# Patient Record
Sex: FEMALE | Race: WHITE | HISPANIC OR LATINO | URBAN - METROPOLITAN AREA
[De-identification: names, ages, dates, MRNs, and addresses within clinical notes are randomized per-mention and may not be internally consistent; named-entity substitution may affect disease eponyms.]

---

## 2017-04-23 ENCOUNTER — EMERGENCY (EMERGENCY)
Facility: HOSPITAL | Age: 57
LOS: 1 days | Discharge: HOME | End: 2017-04-23
Attending: EMERGENCY MEDICINE | Admitting: EMERGENCY MEDICINE
Payer: COMMERCIAL

## 2017-04-23 VITALS
TEMPERATURE: 99 F | OXYGEN SATURATION: 98 % | RESPIRATION RATE: 18 BRPM | DIASTOLIC BLOOD PRESSURE: 79 MMHG | SYSTOLIC BLOOD PRESSURE: 137 MMHG | HEART RATE: 74 BPM

## 2017-04-23 VITALS
OXYGEN SATURATION: 97 % | RESPIRATION RATE: 24 BRPM | DIASTOLIC BLOOD PRESSURE: 85 MMHG | HEART RATE: 94 BPM | TEMPERATURE: 102 F | SYSTOLIC BLOOD PRESSURE: 165 MMHG | WEIGHT: 164.91 LBS

## 2017-04-23 DIAGNOSIS — R10.9 UNSPECIFIED ABDOMINAL PAIN: ICD-10-CM

## 2017-04-23 DIAGNOSIS — N12 TUBULO-INTERSTITIAL NEPHRITIS, NOT SPECIFIED AS ACUTE OR CHRONIC: ICD-10-CM

## 2017-04-23 LAB
ALBUMIN SERPL ELPH-MCNC: 3.9 G/DL — SIGNIFICANT CHANGE UP (ref 3.4–5)
ALP SERPL-CCNC: 84 U/L — SIGNIFICANT CHANGE UP (ref 40–120)
ALT FLD-CCNC: 25 U/L — SIGNIFICANT CHANGE UP (ref 12–42)
ANION GAP SERPL CALC-SCNC: 10 MMOL/L — SIGNIFICANT CHANGE UP (ref 9–16)
APPEARANCE UR: CLEAR — SIGNIFICANT CHANGE UP
AST SERPL-CCNC: 27 U/L — SIGNIFICANT CHANGE UP (ref 15–37)
BACTERIA # UR AUTO: PRESENT /HPF
BASOPHILS NFR BLD AUTO: 0.1 % — SIGNIFICANT CHANGE UP (ref 0–2)
BILIRUB SERPL-MCNC: 0.8 MG/DL — SIGNIFICANT CHANGE UP (ref 0.2–1.2)
BILIRUB UR-MCNC: NEGATIVE — SIGNIFICANT CHANGE UP
BUN SERPL-MCNC: 11 MG/DL — SIGNIFICANT CHANGE UP (ref 7–23)
CALCIUM SERPL-MCNC: 9.1 MG/DL — SIGNIFICANT CHANGE UP (ref 8.5–10.5)
CHLORIDE SERPL-SCNC: 97 MMOL/L — SIGNIFICANT CHANGE UP (ref 96–108)
CO2 SERPL-SCNC: 28 MMOL/L — SIGNIFICANT CHANGE UP (ref 22–31)
COLOR SPEC: YELLOW — SIGNIFICANT CHANGE UP
CREAT SERPL-MCNC: 0.79 MG/DL — SIGNIFICANT CHANGE UP (ref 0.5–1.3)
DIFF PNL FLD: (no result)
EOSINOPHIL NFR BLD AUTO: 0.1 % — SIGNIFICANT CHANGE UP (ref 0–6)
EPI CELLS # UR: SIGNIFICANT CHANGE UP /HPF
GLUCOSE SERPL-MCNC: 135 MG/DL — HIGH (ref 70–99)
GLUCOSE UR QL: NEGATIVE — SIGNIFICANT CHANGE UP
HCT VFR BLD CALC: 35.2 % — SIGNIFICANT CHANGE UP (ref 34.5–45)
HGB BLD-MCNC: 12.4 G/DL — SIGNIFICANT CHANGE UP (ref 11.5–15.5)
KETONES UR-MCNC: NEGATIVE — SIGNIFICANT CHANGE UP
LACTATE SERPL-SCNC: 1.3 MMOL/L — SIGNIFICANT CHANGE UP (ref 0.5–2)
LEUKOCYTE ESTERASE UR-ACNC: (no result)
LYMPHOCYTES # BLD AUTO: 16.5 % — SIGNIFICANT CHANGE UP (ref 13–44)
MCHC RBC-ENTMCNC: 31.6 PG — SIGNIFICANT CHANGE UP (ref 27–34)
MCHC RBC-ENTMCNC: 35.2 G/DL — SIGNIFICANT CHANGE UP (ref 32–36)
MCV RBC AUTO: 89.8 FL — SIGNIFICANT CHANGE UP (ref 80–100)
MONOCYTES NFR BLD AUTO: 7.9 % — SIGNIFICANT CHANGE UP (ref 2–14)
NEUTROPHILS NFR BLD AUTO: 75.4 % — SIGNIFICANT CHANGE UP (ref 43–77)
NITRITE UR-MCNC: NEGATIVE — SIGNIFICANT CHANGE UP
PH UR: 7 — SIGNIFICANT CHANGE UP (ref 5–8)
PLATELET # BLD AUTO: 241 K/UL — SIGNIFICANT CHANGE UP (ref 150–400)
POTASSIUM SERPL-MCNC: 3.6 MMOL/L — SIGNIFICANT CHANGE UP (ref 3.5–5.3)
POTASSIUM SERPL-SCNC: 3.6 MMOL/L — SIGNIFICANT CHANGE UP (ref 3.5–5.3)
PROT SERPL-MCNC: 8.2 G/DL — SIGNIFICANT CHANGE UP (ref 6.4–8.2)
PROT UR-MCNC: NEGATIVE MG/DL — SIGNIFICANT CHANGE UP
RBC # BLD: 3.92 M/UL — SIGNIFICANT CHANGE UP (ref 3.8–5.2)
RBC # FLD: 11.4 % — SIGNIFICANT CHANGE UP (ref 10.3–16.9)
RBC CASTS # UR COMP ASSIST: < 5 /HPF — SIGNIFICANT CHANGE UP
SODIUM SERPL-SCNC: 135 MMOL/L — SIGNIFICANT CHANGE UP (ref 135–145)
SP GR SPEC: <=1.005 — SIGNIFICANT CHANGE UP (ref 1–1.03)
UROBILINOGEN FLD QL: 0.2 E.U./DL — SIGNIFICANT CHANGE UP
WBC # BLD: 10.2 K/UL — SIGNIFICANT CHANGE UP (ref 3.8–10.5)
WBC # FLD AUTO: 10.2 K/UL — SIGNIFICANT CHANGE UP (ref 3.8–10.5)
WBC UR QL: < 5 /HPF — SIGNIFICANT CHANGE UP

## 2017-04-23 PROCEDURE — 93010 ELECTROCARDIOGRAM REPORT: CPT

## 2017-04-23 PROCEDURE — 99284 EMERGENCY DEPT VISIT MOD MDM: CPT | Mod: 25

## 2017-04-23 PROCEDURE — 80053 COMPREHEN METABOLIC PANEL: CPT

## 2017-04-23 PROCEDURE — 96374 THER/PROPH/DIAG INJ IV PUSH: CPT

## 2017-04-23 PROCEDURE — 93005 ELECTROCARDIOGRAM TRACING: CPT

## 2017-04-23 PROCEDURE — 83605 ASSAY OF LACTIC ACID: CPT

## 2017-04-23 PROCEDURE — 74176 CT ABD & PELVIS W/O CONTRAST: CPT

## 2017-04-23 PROCEDURE — 36415 COLL VENOUS BLD VENIPUNCTURE: CPT

## 2017-04-23 PROCEDURE — 96375 TX/PRO/DX INJ NEW DRUG ADDON: CPT

## 2017-04-23 PROCEDURE — 87040 BLOOD CULTURE FOR BACTERIA: CPT

## 2017-04-23 PROCEDURE — 99285 EMERGENCY DEPT VISIT HI MDM: CPT | Mod: 25

## 2017-04-23 PROCEDURE — 87086 URINE CULTURE/COLONY COUNT: CPT

## 2017-04-23 PROCEDURE — 81001 URINALYSIS AUTO W/SCOPE: CPT

## 2017-04-23 PROCEDURE — 85025 COMPLETE CBC W/AUTO DIFF WBC: CPT

## 2017-04-23 PROCEDURE — 74176 CT ABD & PELVIS W/O CONTRAST: CPT | Mod: 26

## 2017-04-23 RX ORDER — CEPHALEXIN 500 MG
1 CAPSULE ORAL
Qty: 20 | Refills: 0
Start: 2017-04-23 | End: 2017-05-03

## 2017-04-23 RX ORDER — KETOROLAC TROMETHAMINE 30 MG/ML
30 SYRINGE (ML) INJECTION ONCE
Qty: 0 | Refills: 0 | Status: DISCONTINUED | OUTPATIENT
Start: 2017-04-23 | End: 2017-04-23

## 2017-04-23 RX ORDER — CEPHALEXIN 500 MG
500 CAPSULE ORAL ONCE
Qty: 0 | Refills: 0 | Status: COMPLETED | OUTPATIENT
Start: 2017-04-23 | End: 2017-04-23

## 2017-04-23 RX ORDER — CEFTRIAXONE 500 MG/1
1 INJECTION, POWDER, FOR SOLUTION INTRAMUSCULAR; INTRAVENOUS EVERY 24 HOURS
Qty: 0 | Refills: 0 | Status: DISCONTINUED | OUTPATIENT
Start: 2017-04-24 | End: 2017-04-27

## 2017-04-23 RX ORDER — CEFTRIAXONE 500 MG/1
1 INJECTION, POWDER, FOR SOLUTION INTRAMUSCULAR; INTRAVENOUS ONCE
Qty: 0 | Refills: 0 | Status: COMPLETED | OUTPATIENT
Start: 2017-04-23 | End: 2017-04-23

## 2017-04-23 RX ORDER — SODIUM CHLORIDE 9 MG/ML
3 INJECTION INTRAMUSCULAR; INTRAVENOUS; SUBCUTANEOUS ONCE
Qty: 0 | Refills: 0 | Status: COMPLETED | OUTPATIENT
Start: 2017-04-23 | End: 2017-04-23

## 2017-04-23 RX ORDER — ACETAMINOPHEN 500 MG
975 TABLET ORAL ONCE
Qty: 0 | Refills: 0 | Status: DISCONTINUED | OUTPATIENT
Start: 2017-04-23 | End: 2017-04-23

## 2017-04-23 RX ORDER — SODIUM CHLORIDE 9 MG/ML
500 INJECTION INTRAMUSCULAR; INTRAVENOUS; SUBCUTANEOUS
Qty: 0 | Refills: 0 | Status: COMPLETED | OUTPATIENT
Start: 2017-04-23 | End: 2017-04-23

## 2017-04-23 RX ORDER — CEFTRIAXONE 500 MG/1
INJECTION, POWDER, FOR SOLUTION INTRAMUSCULAR; INTRAVENOUS
Qty: 0 | Refills: 0 | Status: DISCONTINUED | OUTPATIENT
Start: 2017-04-23 | End: 2017-04-27

## 2017-04-23 RX ADMIN — Medication 500 MILLIGRAM(S): at 19:09

## 2017-04-23 RX ADMIN — SODIUM CHLORIDE 3 MILLILITER(S): 9 INJECTION INTRAMUSCULAR; INTRAVENOUS; SUBCUTANEOUS at 16:08

## 2017-04-23 RX ADMIN — SODIUM CHLORIDE 2000 MILLILITER(S): 9 INJECTION INTRAMUSCULAR; INTRAVENOUS; SUBCUTANEOUS at 17:49

## 2017-04-23 RX ADMIN — SODIUM CHLORIDE 2000 MILLILITER(S): 9 INJECTION INTRAMUSCULAR; INTRAVENOUS; SUBCUTANEOUS at 16:38

## 2017-04-23 RX ADMIN — SODIUM CHLORIDE 2000 MILLILITER(S): 9 INJECTION INTRAMUSCULAR; INTRAVENOUS; SUBCUTANEOUS at 16:08

## 2017-04-23 RX ADMIN — Medication 30 MILLIGRAM(S): at 16:36

## 2017-04-23 RX ADMIN — CEFTRIAXONE 100 GRAM(S): 500 INJECTION, POWDER, FOR SOLUTION INTRAMUSCULAR; INTRAVENOUS at 16:36

## 2017-04-23 RX ADMIN — SODIUM CHLORIDE 2000 MILLILITER(S): 9 INJECTION INTRAMUSCULAR; INTRAVENOUS; SUBCUTANEOUS at 16:57

## 2017-04-23 RX ADMIN — Medication 30 MILLIGRAM(S): at 16:57

## 2017-04-23 RX ADMIN — SODIUM CHLORIDE 2000 MILLILITER(S): 9 INJECTION INTRAMUSCULAR; INTRAVENOUS; SUBCUTANEOUS at 16:26

## 2017-04-23 NOTE — ED PROVIDER NOTE - OBJECTIVE STATEMENT
56 F co R flank pain- sharp pain x 3 days, with urgency and frequency - fever and chills   took 2 doses of cipro - from prior uti  vss  s1s2 lungs cta bl  abd soft nt nd +bs  + R CVA   IMP- IMP- pyelo vs stones  labs, ivf, reassess

## 2017-04-23 NOTE — ED ADULT NURSE NOTE - OBJECTIVE STATEMENT
Pt reports RUQ pain with tenderness upon palpation and right flank pain that began approx Friday. Pt stated she had nausea and 1 episode of vomiting earlier today with a fever. Pt reports having "urinary pressure for the past week." Denies burning on urination, hematuria, CP, SOB.

## 2017-04-24 LAB
CULTURE RESULTS: NO GROWTH — SIGNIFICANT CHANGE UP
SPECIMEN SOURCE: SIGNIFICANT CHANGE UP

## 2017-04-28 LAB
CULTURE RESULTS: SIGNIFICANT CHANGE UP
CULTURE RESULTS: SIGNIFICANT CHANGE UP
SPECIMEN SOURCE: SIGNIFICANT CHANGE UP
SPECIMEN SOURCE: SIGNIFICANT CHANGE UP

## 2019-05-16 ENCOUNTER — EMERGENCY (EMERGENCY)
Facility: HOSPITAL | Age: 59
LOS: 1 days | Discharge: HOME | End: 2019-05-16
Admitting: EMERGENCY MEDICINE
Payer: COMMERCIAL

## 2019-05-16 VITALS
RESPIRATION RATE: 20 BRPM | TEMPERATURE: 98 F | OXYGEN SATURATION: 97 % | SYSTOLIC BLOOD PRESSURE: 166 MMHG | HEIGHT: 61 IN | HEART RATE: 85 BPM | WEIGHT: 162.04 LBS | DIASTOLIC BLOOD PRESSURE: 81 MMHG

## 2019-05-16 DIAGNOSIS — M79.661 PAIN IN RIGHT LOWER LEG: ICD-10-CM

## 2019-05-16 DIAGNOSIS — Z79.2 LONG TERM (CURRENT) USE OF ANTIBIOTICS: ICD-10-CM

## 2019-05-16 DIAGNOSIS — R20.2 PARESTHESIA OF SKIN: ICD-10-CM

## 2019-05-16 DIAGNOSIS — M79.604 PAIN IN RIGHT LEG: ICD-10-CM

## 2019-05-16 LAB
ALBUMIN SERPL ELPH-MCNC: 4.1 G/DL — SIGNIFICANT CHANGE UP (ref 3.3–5)
ALP SERPL-CCNC: 78 U/L — SIGNIFICANT CHANGE UP (ref 40–120)
ALT FLD-CCNC: 25 U/L — SIGNIFICANT CHANGE UP (ref 10–45)
ANION GAP SERPL CALC-SCNC: 9 MMOL/L — SIGNIFICANT CHANGE UP (ref 5–17)
AST SERPL-CCNC: 24 U/L — SIGNIFICANT CHANGE UP (ref 10–40)
BASOPHILS # BLD AUTO: 0.03 K/UL — SIGNIFICANT CHANGE UP (ref 0–0.2)
BASOPHILS NFR BLD AUTO: 0.4 % — SIGNIFICANT CHANGE UP (ref 0–2)
BILIRUB SERPL-MCNC: <0.2 MG/DL — SIGNIFICANT CHANGE UP (ref 0.2–1.2)
BUN SERPL-MCNC: 17 MG/DL — SIGNIFICANT CHANGE UP (ref 7–23)
CALCIUM SERPL-MCNC: 10.3 MG/DL — SIGNIFICANT CHANGE UP (ref 8.4–10.5)
CHLORIDE SERPL-SCNC: 104 MMOL/L — SIGNIFICANT CHANGE UP (ref 96–108)
CK SERPL-CCNC: 127 U/L — SIGNIFICANT CHANGE UP (ref 25–170)
CO2 SERPL-SCNC: 26 MMOL/L — SIGNIFICANT CHANGE UP (ref 22–31)
CREAT SERPL-MCNC: 0.86 MG/DL — SIGNIFICANT CHANGE UP (ref 0.5–1.3)
EOSINOPHIL # BLD AUTO: 0.2 K/UL — SIGNIFICANT CHANGE UP (ref 0–0.5)
EOSINOPHIL NFR BLD AUTO: 2.8 % — SIGNIFICANT CHANGE UP (ref 0–6)
GLUCOSE SERPL-MCNC: 123 MG/DL — HIGH (ref 70–99)
HCT VFR BLD CALC: 35.8 % — SIGNIFICANT CHANGE UP (ref 34.5–45)
HGB BLD-MCNC: 12 G/DL — SIGNIFICANT CHANGE UP (ref 11.5–15.5)
IMM GRANULOCYTES NFR BLD AUTO: 0.4 % — SIGNIFICANT CHANGE UP (ref 0–1.5)
LYMPHOCYTES # BLD AUTO: 2.95 K/UL — SIGNIFICANT CHANGE UP (ref 1–3.3)
LYMPHOCYTES # BLD AUTO: 40.6 % — SIGNIFICANT CHANGE UP (ref 13–44)
MCHC RBC-ENTMCNC: 30.9 PG — SIGNIFICANT CHANGE UP (ref 27–34)
MCHC RBC-ENTMCNC: 33.5 GM/DL — SIGNIFICANT CHANGE UP (ref 32–36)
MCV RBC AUTO: 92.3 FL — SIGNIFICANT CHANGE UP (ref 80–100)
MONOCYTES # BLD AUTO: 0.46 K/UL — SIGNIFICANT CHANGE UP (ref 0–0.9)
MONOCYTES NFR BLD AUTO: 6.3 % — SIGNIFICANT CHANGE UP (ref 2–14)
NEUTROPHILS # BLD AUTO: 3.59 K/UL — SIGNIFICANT CHANGE UP (ref 1.8–7.4)
NEUTROPHILS NFR BLD AUTO: 49.5 % — SIGNIFICANT CHANGE UP (ref 43–77)
NRBC # BLD: 0 /100 WBCS — SIGNIFICANT CHANGE UP (ref 0–0)
PLATELET # BLD AUTO: 284 K/UL — SIGNIFICANT CHANGE UP (ref 150–400)
POTASSIUM SERPL-MCNC: 4.6 MMOL/L — SIGNIFICANT CHANGE UP (ref 3.5–5.3)
POTASSIUM SERPL-SCNC: 4.6 MMOL/L — SIGNIFICANT CHANGE UP (ref 3.5–5.3)
PROT SERPL-MCNC: 7.4 G/DL — SIGNIFICANT CHANGE UP (ref 6–8.3)
RBC # BLD: 3.88 M/UL — SIGNIFICANT CHANGE UP (ref 3.8–5.2)
RBC # FLD: 11.6 % — SIGNIFICANT CHANGE UP (ref 10.3–14.5)
SODIUM SERPL-SCNC: 139 MMOL/L — SIGNIFICANT CHANGE UP (ref 135–145)
WBC # BLD: 7.26 K/UL — SIGNIFICANT CHANGE UP (ref 3.8–10.5)
WBC # FLD AUTO: 7.26 K/UL — SIGNIFICANT CHANGE UP (ref 3.8–10.5)

## 2019-05-16 PROCEDURE — 36415 COLL VENOUS BLD VENIPUNCTURE: CPT

## 2019-05-16 PROCEDURE — 83735 ASSAY OF MAGNESIUM: CPT

## 2019-05-16 PROCEDURE — 99284 EMERGENCY DEPT VISIT MOD MDM: CPT | Mod: 25

## 2019-05-16 PROCEDURE — 96374 THER/PROPH/DIAG INJ IV PUSH: CPT

## 2019-05-16 PROCEDURE — 93971 EXTREMITY STUDY: CPT

## 2019-05-16 PROCEDURE — 73562 X-RAY EXAM OF KNEE 3: CPT

## 2019-05-16 PROCEDURE — 93971 EXTREMITY STUDY: CPT | Mod: 26,RT

## 2019-05-16 PROCEDURE — 99284 EMERGENCY DEPT VISIT MOD MDM: CPT

## 2019-05-16 PROCEDURE — 85025 COMPLETE CBC W/AUTO DIFF WBC: CPT

## 2019-05-16 PROCEDURE — 73562 X-RAY EXAM OF KNEE 3: CPT | Mod: 26,RT

## 2019-05-16 PROCEDURE — 82550 ASSAY OF CK (CPK): CPT

## 2019-05-16 PROCEDURE — 80053 COMPREHEN METABOLIC PANEL: CPT

## 2019-05-16 RX ORDER — KETOROLAC TROMETHAMINE 30 MG/ML
30 SYRINGE (ML) INJECTION ONCE
Refills: 0 | Status: DISCONTINUED | OUTPATIENT
Start: 2019-05-16 | End: 2019-05-16

## 2019-05-16 RX ORDER — DIAZEPAM 5 MG
5 TABLET ORAL ONCE
Refills: 0 | Status: DISCONTINUED | OUTPATIENT
Start: 2019-05-16 | End: 2019-05-16

## 2019-05-16 RX ORDER — TRAMADOL HYDROCHLORIDE 50 MG/1
1 TABLET ORAL
Qty: 10 | Refills: 0
Start: 2019-05-16 | End: 2019-05-20

## 2019-05-16 RX ORDER — DIAZEPAM 5 MG
1 TABLET ORAL
Qty: 10 | Refills: 0
Start: 2019-05-16 | End: 2019-05-20

## 2019-05-16 RX ADMIN — Medication 30 MILLIGRAM(S): at 20:12

## 2019-05-16 RX ADMIN — Medication 5 MILLIGRAM(S): at 20:12

## 2019-05-16 NOTE — ED ADULT TRIAGE NOTE - RESPIRATORY RATE (BREATHS/MIN)
Post-Care Instructions: I reviewed with the patient in detail post-care instructions. Patient is to keep the biopsy site dry overnight, and then apply bacitracin twice daily until healed. Patient may apply hydrogen peroxide soaks to remove any crusting.  After the procedure, the patient was observed for 5-10 minutes and was oriented to,person, place and time and denied feeling dizzy, queasy and stated that they were not going to faint Render Post-Care Instructions In Note?: no Accession #: [email or web address] Additional Anesthesia Volume In Cc (Will Not Render If 0): 0 Bill For Surgical Tray: yes Detail Level: Detailed Notification Instructions: Patient will be notified of biopsy results. However, patient instructed to call the office if not contacted within 2 weeks. Anesthesia Type: 1% lidocaine without epinephrine Dressing: bandage Consent: Written consent was obtained and risks were reviewed including but not limited to scarring, infection, bleeding, scabbing, incomplete removal, nerve damage and allergy to anesthesia. 20 Anesthesia Volume In Cc (Will Not Render If 0): 0.3 Biopsy Type: H and E Epidermal Sutures: 5-0 Ethilon Suture Removal: 7 days Wound Care: Vaseline Billing Type: Third-Party Bill Punch Size In Mm: 4 Hemostasis: Aluminum Chloride None

## 2019-05-16 NOTE — ED PROVIDER NOTE - OBJECTIVE STATEMENT
59 yo female, generally healthy and w/o any significant PMH, in the Er due to recurrent pain in her right leg. Pt reports pain is usually very sharp and intense, comes and goes, and most of the time pain is when pt on her feet for a long time. Pt denies any injury to her right leg, denies swelling, discolorations, Pt also concerned that pain became more frequent and more intense recently, and she has tingling to her lower leg.

## 2019-05-16 NOTE — ED PROVIDER NOTE - NSFOLLOWUPCLINICS_GEN_ALL_ED_FT
[facility]  Family Medicine  [facility]. [address]  [place], [place] Critical access hospital  Phone: [phone]  Fax:   Follow Up Time:

## 2019-05-16 NOTE — ED ADULT NURSE NOTE - OBJECTIVE STATEMENT
59 y/o female c/o pain to sharp pain to right leg that began a few days ago. Pt reports she is on her feet a lot at work and pain occurs when she is standing for a long period of time. Denies injury or any other sx.

## 2019-05-16 NOTE — ED PROVIDER NOTE - MUSCULOSKELETAL, MLM
Spine appears normal, all extremities grossly intact and range of motion is not limited,  right LE- diffuse generalized tenderness+, +calf tenderness, +tenderness over the knee joint,  good distal pulses, normal sensation, no LS spine tenderness,

## 2019-05-16 NOTE — ED PROVIDER NOTE - NSFOLLOWUPINSTRUCTIONS_ED_ALL_ED_FT
I have discussed the discharge plan with the patient. The patient agrees with the plan, as discussed.  The patient understands Emergency Department diagnosis is a preliminary diagnosis often based on limited information and that the patient must adhere to the follow-up plan as discussed.  The patient understands that if the symptoms worsen or if prescribed medications do not have the desired/planned effect that the patient may return to the Emergency Department at any time for further evaluation and treatment.    Leg Cramps  Leg cramps occur when one or more muscles tighten and you have no control over this tightening (involuntary muscle contraction). Muscle cramps can develop in any muscle, but the most common place is in the calf muscles of the leg. Those cramps can occur during exercise or when you are at rest. Leg cramps are painful, and they may last for a few seconds to a few minutes. Cramps may return several times before they finally stop.    Usually, leg cramps are not caused by a serious medical problem. In many cases, the cause is not known. Some common causes include:  Excessive physical effort (overexertion), such as during intense exercise.  Overuse from repetitive motions, or doing the same thing over and over.  Staying in a certain position for a long period of time.  Improper preparation, form, or technique while performing a sport or an activity.  Dehydration.  Injury.  Side effects of certain medicines.  Abnormally low levels of minerals in your blood (electrolytes), especially potassium and calcium. This could result from:  Pregnancy.  Taking diuretic medicines.  Follow these instructions at home:  Eating and drinking     Drink enough fluid to keep your urine pale yellow. Staying hydrated may help prevent cramps.  Eat a healthy diet that includes plenty of nutrients to help your muscles function. A healthy diet includes fruits and vegetables, lean protein, whole grains, and low-fat or nonfat dairy products.  Managing pain, stiffness, and swelling     Image Image   Try massaging, stretching, and relaxing the affected muscle. Do this for several minutes at a time.  If directed, put ice on areas that are sore or painful after a cramp:  Put ice in a plastic bag.  Place a towel between your skin and the bag.  Leave the ice on for 20 minutes, 2–3 times a day.  If directed, apply heat to muscles that are tense or tight. Do this before you exercise, or as often as told by your health care provider. Use the heat source that your health care provider recommends, such as a moist heat pack or a heating pad.  Place a towel between your skin and the heat source.  Leave the heat on for 20–30 minutes.   Remove the heat if your skin turns bright red. This is especially important if you are unable to feel pain, heat, or cold. You may have a greater risk of getting burned.  Try taking hot showers or baths to help relax tight muscles.  General instructions     If you are having frequent leg cramps, avoid intense exercise for several days.  Take over-the-counter and prescription medicines only as told by your health care provider.  Keep all follow-up visits as told by your health care provider. This is important.  Contact a health care provider if:  Your leg cramps get more severe or more frequent, or they do not improve over time.  Your foot becomes cold, numb, or blue.  Summary  Muscle cramps can develop in any muscle, but the most common place is in the calf muscles of the leg.  Leg cramps are painful, and they may last for a few seconds to a few minutes.  Usually, leg cramps are not caused by a serious medical problem. Often, the cause is not known.  Stay hydrated and take over-the-counter and prescription medicines only as told by your health care provider.  This information is not intended to replace advice given to you by your health care provider. Make sure you discuss any questions you have with your health care provider.

## 2019-05-16 NOTE — ED PROVIDER NOTE - CLINICAL SUMMARY MEDICAL DECISION MAKING FREE TEXT BOX
57 yo female in the ER due to recurrent pain to her right leg, recently with burning sensation to right foot, also severe pain to right knee and calf.   No known injury. no clinical signs of infection, no evidence of DVT on doppler and no acute fx to on the right knee Xray. most likely sciatica + osteoarthritis pf knee. results discussed with pt. stable for discharge and out ppt f/u recommended.

## 2019-05-21 ENCOUNTER — APPOINTMENT (OUTPATIENT)
Dept: INTERNAL MEDICINE | Facility: CLINIC | Age: 59
End: 2019-05-21
Payer: COMMERCIAL

## 2019-05-21 VITALS
HEART RATE: 68 BPM | HEIGHT: 61 IN | SYSTOLIC BLOOD PRESSURE: 139 MMHG | DIASTOLIC BLOOD PRESSURE: 89 MMHG | TEMPERATURE: 97.8 F | WEIGHT: 160 LBS | OXYGEN SATURATION: 96 % | BODY MASS INDEX: 30.21 KG/M2

## 2019-05-21 DIAGNOSIS — Z76.89 PERSONS ENCOUNTERING HEALTH SERVICES IN OTHER SPECIFIED CIRCUMSTANCES: ICD-10-CM

## 2019-05-21 DIAGNOSIS — Z00.00 ENCOUNTER FOR GENERAL ADULT MEDICAL EXAMINATION W/OUT ABNORMAL FINDINGS: ICD-10-CM

## 2019-05-21 DIAGNOSIS — Z82.5 FAMILY HISTORY OF ASTHMA AND OTHER CHRONIC LOWER RESPIRATORY DISEASES: ICD-10-CM

## 2019-05-21 DIAGNOSIS — F17.200 NICOTINE DEPENDENCE, UNSPECIFIED, UNCOMPLICATED: ICD-10-CM

## 2019-05-21 DIAGNOSIS — Z87.891 PERSONAL HISTORY OF NICOTINE DEPENDENCE: ICD-10-CM

## 2019-05-21 PROCEDURE — G0444 DEPRESSION SCREEN ANNUAL: CPT

## 2019-05-21 PROCEDURE — 99204 OFFICE O/P NEW MOD 45 MIN: CPT | Mod: 25,GC

## 2019-05-21 PROCEDURE — G0442 ANNUAL ALCOHOL SCREEN 15 MIN: CPT

## 2019-05-21 PROCEDURE — 99406 BEHAV CHNG SMOKING 3-10 MIN: CPT

## 2019-05-22 ENCOUNTER — RX RENEWAL (OUTPATIENT)
Age: 59
End: 2019-05-22

## 2019-05-22 PROBLEM — Z76.89 RETURN TO WORK EVALUATION: Status: ACTIVE | Noted: 2019-05-22

## 2019-05-22 PROBLEM — Z00.00 ENCOUNTER FOR PREVENTIVE HEALTH EXAMINATION: Status: ACTIVE | Noted: 2019-05-20

## 2019-05-22 PROBLEM — F17.200 SMOKING: Status: ACTIVE | Noted: 2019-05-22

## 2019-06-04 ENCOUNTER — APPOINTMENT (OUTPATIENT)
Dept: INTERNAL MEDICINE | Facility: CLINIC | Age: 59
End: 2019-06-04
Payer: COMMERCIAL

## 2019-06-04 VITALS
SYSTOLIC BLOOD PRESSURE: 151 MMHG | TEMPERATURE: 98 F | HEART RATE: 66 BPM | DIASTOLIC BLOOD PRESSURE: 90 MMHG | RESPIRATION RATE: 16 BRPM | OXYGEN SATURATION: 98 %

## 2019-06-04 DIAGNOSIS — Z13.31 ENCOUNTER FOR SCREENING FOR DEPRESSION: ICD-10-CM

## 2019-06-04 DIAGNOSIS — R03.0 ELEVATED BLOOD-PRESSURE READING, W/OUT DIAGNOSIS OF HYPERTENSION: ICD-10-CM

## 2019-06-04 PROCEDURE — 99214 OFFICE O/P EST MOD 30 MIN: CPT

## 2019-06-04 RX ORDER — FAMOTIDINE 20 MG/1
20 TABLET, FILM COATED ORAL
Qty: 60 | Refills: 0 | Status: ACTIVE | COMMUNITY
Start: 2019-05-21 | End: 1900-01-01

## 2019-06-19 ENCOUNTER — APPOINTMENT (OUTPATIENT)
Dept: PHYSICAL MEDICINE AND REHAB | Facility: CLINIC | Age: 59
End: 2019-06-19
Payer: COMMERCIAL

## 2019-06-19 VITALS — HEIGHT: 61 IN | WEIGHT: 160 LBS | OXYGEN SATURATION: 99 % | BODY MASS INDEX: 30.21 KG/M2 | HEART RATE: 77 BPM

## 2019-06-19 DIAGNOSIS — Z82.61 FAMILY HISTORY OF ARTHRITIS: ICD-10-CM

## 2019-06-19 DIAGNOSIS — Z80.9 FAMILY HISTORY OF MALIGNANT NEOPLASM, UNSPECIFIED: ICD-10-CM

## 2019-06-19 DIAGNOSIS — Z78.9 OTHER SPECIFIED HEALTH STATUS: ICD-10-CM

## 2019-06-19 PROCEDURE — 99204 OFFICE O/P NEW MOD 45 MIN: CPT

## 2019-06-19 RX ORDER — METHYLPREDNISOLONE 4 MG/1
4 TABLET ORAL
Qty: 2 | Refills: 0 | Status: ACTIVE | COMMUNITY
Start: 2019-06-19 | End: 1900-01-01

## 2019-06-19 NOTE — ASSESSMENT
[FreeTextEntry1] : Impression:\par 1. Right L4/5 Radiculopathy\par \par Plan: After review of the history and physical examination the patient's symptoms are consistent with Right L4/5  radiculitis likely due to disc protrusion. There does not seem to an intrinsic knee problem presently. The diagnosis was discussed in detail with the patient. We discussed all the potential treatment options including physical therapy, oral medication, interventional spine procedures, and surgery; as well as alternative therapeutics such as acupuncture and massage. We also discussed the importance of weight loss, ergonomics, and posture in the long term management of the condition. At this time I am recommending that the patient undergo an MRI of the LSpine to further evaluate for disc pathology and nerve root involvement. I have given the patient a Rx for oral steroids with written instructions. We discussed the medication in detail with regard to appropriate use, adverse effects, and expected outcome. We did discuss HERNANDO however the patient does not have any interest in injections and would likely consider surgery before injection. We will discuss further at her MRI follow up in 1-2 weeks. The patient expressed verbal understanding and is in agreement with the plan of care. All of the patient's questions and concerns were addressed during today's visit.

## 2019-06-19 NOTE — HISTORY OF PRESENT ILLNESS
[FreeTextEntry1] : Ms. HARRY SEYMOUR is a very pleasant 59 year female who comes in for evaluation of right knee that has been ongoing for two years. The patient has just finished a course of formal PT, completing 8 sessions without improvement. The pain is located primarily on right knee but travels from the lower back down the right hip and into the right leg/ankle/foot, intermittent in nature and described as numb/swollen/weak. The pain is rated as 6/10 during today's visit, and ranges from 4-10/10. The patient's symptoms are aggravated by day to day activity  and alleviated by elevating her leg. The patient denies any night pain or bowel/bladder dysfunction. The patient has no other complaints at this time.\par  \par

## 2019-06-19 NOTE — PHYSICAL EXAM
[FreeTextEntry1] : GEN: AAOx3, NAD.\par PSYCH: Normal mood and affect. Responds appropriately to commands.\par EYES: Sclerae Anicteric. No discharge. EOMI.\par RESP: Breathing unlabored.\par CV: DP pulses 2+ and equal. No varicosities noted.\par EXT: No C/C/E.\par SKIN: No lesions noted.\par STRENGTH: 5/5 bilateral hip flexors, knee extensors, knee flexors, ankle dorsiflexors, long toe extensors, ankle plantar flexors, hip extensors, hip abductors.\par TONE: Normal, No clonus.\par REFLEXES: Symmetric patella, medial hamstring, achilles. Plantars downgoing bilaterally.\par SENS: Grossly intact to light touch bilateral lower extremities.\par INSP: Spine alignment is midline, with no evidence of scoliosis.\par STANCE: Single leg stance unable to perform.\par GAIT: (+) antalgic, normal reciprocating heel to toe\par LUMBAR ROM: Flexion limited w/ axial/radicular symptoms. Extension, side-bending, rotation, oblique extension all full and pain free.  \par HIP ROM: Full and pain free bilaterally.\par PALP: There is no tenderness over the midline spinous processes, paravertebral muscles, SIJ, or greater trochanters bilaterally.\par SPECIAL: SLR and Slump test (+) Right. FADIR, CATARINA negative bilaterally.

## 2019-06-25 ENCOUNTER — APPOINTMENT (OUTPATIENT)
Dept: INTERNAL MEDICINE | Facility: CLINIC | Age: 59
End: 2019-06-25
Payer: COMMERCIAL

## 2019-06-25 VITALS
OXYGEN SATURATION: 98 % | SYSTOLIC BLOOD PRESSURE: 124 MMHG | HEIGHT: 61 IN | TEMPERATURE: 97.5 F | WEIGHT: 160 LBS | HEART RATE: 69 BPM | BODY MASS INDEX: 30.21 KG/M2 | DIASTOLIC BLOOD PRESSURE: 77 MMHG

## 2019-06-25 DIAGNOSIS — M54.16 RADICULOPATHY, LUMBAR REGION: ICD-10-CM

## 2019-06-25 DIAGNOSIS — M54.31 SCIATICA, RIGHT SIDE: ICD-10-CM

## 2019-06-25 DIAGNOSIS — R10.13 EPIGASTRIC PAIN: ICD-10-CM

## 2019-06-25 DIAGNOSIS — R26.81 UNSTEADINESS ON FEET: ICD-10-CM

## 2019-06-25 PROCEDURE — 99214 OFFICE O/P EST MOD 30 MIN: CPT

## 2019-06-25 RX ORDER — NAPROXEN 250 MG/1
250 TABLET ORAL TWICE DAILY
Qty: 14 | Refills: 0 | Status: ACTIVE | COMMUNITY
Start: 2019-05-16 | End: 1900-01-01

## 2019-06-25 RX ORDER — TRAMADOL HYDROCHLORIDE 50 MG/1
50 TABLET, COATED ORAL
Qty: 10 | Refills: 0 | Status: COMPLETED | COMMUNITY
Start: 2019-05-16 | End: 2019-06-25

## 2019-06-25 RX ORDER — TRAMADOL HYDROCHLORIDE 50 MG/1
50 TABLET, COATED ORAL
Qty: 14 | Refills: 0 | Status: ACTIVE | COMMUNITY
Start: 2019-05-22 | End: 1900-01-01

## 2019-06-25 NOTE — REVIEW OF SYSTEMS
[Back Pain] : back pain [Depression] : depression [Negative] : Integumentary [FreeTextEntry2] : gained 10lbs in 2 mos [FreeTextEntry9] : low back pain radiating down RLE worsening by standing and lifting.  [de-identified] : reports depression related to back pain

## 2019-06-25 NOTE — HEALTH RISK ASSESSMENT
[No falls in past year] : Patient reported no falls in the past year [2] : 1) Little interest or pleasure doing things for more than half of the days (2) [] : No [de-identified] : socially [[id number]] : 4

## 2019-06-25 NOTE — HISTORY OF PRESENT ILLNESS
[Musculoskeletal Symptoms Knees] : knee [Constant] : constant [Severe] : severe [Rest] : rest [Heat] : heat [Activity] : with activity [Worsening] : worsening [de-identified] : low back pain [FreeTextEntry1] : 3 months ago [de-identified] : worse at times [FreeTextEntry2] : radiates down right leg [FreeTextEntry4] : standing [FreeTextEntry8] : Pt is seen today for c/o sever low back pain radiating down RLE.  [de-identified] : 57 yo female with 3 month hx right sided intermittent lower back pain with radiation down the lateral thigh and into the medial calf. Pain is primarily described as burning and shooting in quality - "like hot oil running down her leg". There are no exacerbating factors. Pain is partially relieved with tramadol and naproxen. Prednisone dose pack did not help. The pain has been present for 3 months; worsening over past one month. Pain worsens intermittently, in 90 second bursts and abates on its own. Completed PTwithout improvement. Reports purchasing a heating pad for her back. 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 Pain is worsened by lifting and standing at work. She denies weight loss, fever, chills, travel, iv drug use. Reports back pain is causing her to feel depressed. Seen by PM&R, declined steroid back injection, completed prednisone without improvement and will have MRI spine on 7/2/19. Awaiting results. Tramadol and naproxen renewed today x 7 days. Referral to ortho provided. Pt crying intermittently during visiting describing trying to work and experiencing severe pain. Letter provided requesting pt be excused from work duties until 7/3/19. Instructed pt that plan will be re-evaluated based on results of MRI spine. \par \par Reports going to [facility] on [address] in the past.

## 2019-06-25 NOTE — COUNSELING
[Weight management counseling provided] : Weight management [Healthy eating counseling provided] : healthy eating [Needs reinforcement, provided] : Patient needs reinforcement on understanding of disease, goals and obesity follow-up plan; reinforcement was provided [Low Fat Diet] : Low fat diet [Low Salt Diet] : Low salt diet [Decrease Portions] : Decrease food portions

## 2019-06-25 NOTE — PHYSICAL EXAM
[Normal Sclera/Conjunctiva] : normal sclera/conjunctiva [Well Developed] : well developed [Normal Outer Ear/Nose] : the outer ears and nose were normal in appearance [No JVD] : no jugular venous distention [No Respiratory Distress] : no respiratory distress  [No Accessory Muscle Use] : no accessory muscle use [Clear to Auscultation] : lungs were clear to auscultation bilaterally [Regular Rhythm] : with a regular rhythm [Normal Rate] : normal rate  [Normal S1, S2] : normal S1 and S2 [No Edema] : there was no peripheral edema [Non-distended] : non-distended [Speech Grossly Normal] : speech grossly normal [Soft] : abdomen soft [Non Tender] : non-tender [de-identified] : (+) right straight leg raise. right lumbar sacral area tenderness.  [de-identified] : noted to be limping; favoring left leg.  [de-identified] : abnormal gait; limping.  [de-identified] : crying intermittently during visiting.

## 2019-07-01 ENCOUNTER — FORM ENCOUNTER (OUTPATIENT)
Age: 59
End: 2019-07-01

## 2019-07-02 ENCOUNTER — APPOINTMENT (OUTPATIENT)
Dept: MRI IMAGING | Facility: HOSPITAL | Age: 59
End: 2019-07-02
Payer: COMMERCIAL

## 2019-07-02 ENCOUNTER — OUTPATIENT (OUTPATIENT)
Dept: OUTPATIENT SERVICES | Facility: HOSPITAL | Age: 59
LOS: 1 days | End: 2019-07-02
Payer: COMMERCIAL

## 2019-07-02 PROCEDURE — 72148 MRI LUMBAR SPINE W/O DYE: CPT | Mod: 26

## 2019-07-02 PROCEDURE — 72148 MRI LUMBAR SPINE W/O DYE: CPT

## 2019-08-04 ENCOUNTER — OUTPATIENT (OUTPATIENT)
Dept: OUTPATIENT SERVICES | Facility: HOSPITAL | Age: 59
LOS: 1 days | End: 2019-08-04
Payer: COMMERCIAL

## 2019-08-04 ENCOUNTER — APPOINTMENT (OUTPATIENT)
Dept: MRI IMAGING | Facility: HOSPITAL | Age: 59
End: 2019-08-04
Payer: COMMERCIAL

## 2019-08-04 PROCEDURE — 72146 MRI CHEST SPINE W/O DYE: CPT | Mod: 26

## 2019-08-04 PROCEDURE — 72146 MRI CHEST SPINE W/O DYE: CPT

## 2019-08-18 VITALS
SYSTOLIC BLOOD PRESSURE: 171 MMHG | HEART RATE: 96 BPM | TEMPERATURE: 98 F | DIASTOLIC BLOOD PRESSURE: 97 MMHG | RESPIRATION RATE: 22 BRPM | HEIGHT: 62 IN | WEIGHT: 164.91 LBS | OXYGEN SATURATION: 99 %

## 2019-08-18 LAB
BASOPHILS # BLD AUTO: 0.02 K/UL — SIGNIFICANT CHANGE UP (ref 0–0.2)
BASOPHILS NFR BLD AUTO: 0.2 % — SIGNIFICANT CHANGE UP (ref 0–2)
EOSINOPHIL # BLD AUTO: 0.07 K/UL — SIGNIFICANT CHANGE UP (ref 0–0.5)
EOSINOPHIL NFR BLD AUTO: 0.7 % — SIGNIFICANT CHANGE UP (ref 0–6)
HCT VFR BLD CALC: 36.8 % — SIGNIFICANT CHANGE UP (ref 34.5–45)
HGB BLD-MCNC: 12.4 G/DL — SIGNIFICANT CHANGE UP (ref 11.5–15.5)
IMM GRANULOCYTES NFR BLD AUTO: 0.2 % — SIGNIFICANT CHANGE UP (ref 0–1.5)
LYMPHOCYTES # BLD AUTO: 3.85 K/UL — HIGH (ref 1–3.3)
LYMPHOCYTES # BLD AUTO: 37.3 % — SIGNIFICANT CHANGE UP (ref 13–44)
MCHC RBC-ENTMCNC: 30.8 PG — SIGNIFICANT CHANGE UP (ref 27–34)
MCHC RBC-ENTMCNC: 33.7 GM/DL — SIGNIFICANT CHANGE UP (ref 32–36)
MCV RBC AUTO: 91.5 FL — SIGNIFICANT CHANGE UP (ref 80–100)
MONOCYTES # BLD AUTO: 0.68 K/UL — SIGNIFICANT CHANGE UP (ref 0–0.9)
MONOCYTES NFR BLD AUTO: 6.6 % — SIGNIFICANT CHANGE UP (ref 2–14)
NEUTROPHILS # BLD AUTO: 5.68 K/UL — SIGNIFICANT CHANGE UP (ref 1.8–7.4)
NEUTROPHILS NFR BLD AUTO: 55 % — SIGNIFICANT CHANGE UP (ref 43–77)
NRBC # BLD: 0 /100 WBCS — SIGNIFICANT CHANGE UP (ref 0–0)
PLATELET # BLD AUTO: 367 K/UL — SIGNIFICANT CHANGE UP (ref 150–400)
RBC # BLD: 4.02 M/UL — SIGNIFICANT CHANGE UP (ref 3.8–5.2)
RBC # FLD: 11.7 % — SIGNIFICANT CHANGE UP (ref 10.3–14.5)
WBC # BLD: 10.32 K/UL — SIGNIFICANT CHANGE UP (ref 3.8–10.5)
WBC # FLD AUTO: 10.32 K/UL — SIGNIFICANT CHANGE UP (ref 3.8–10.5)

## 2019-08-18 RX ORDER — KETOROLAC TROMETHAMINE 30 MG/ML
30 SYRINGE (ML) INJECTION ONCE
Refills: 0 | Status: DISCONTINUED | OUTPATIENT
Start: 2019-08-18 | End: 2019-08-18

## 2019-08-18 RX ORDER — LIDOCAINE 4 G/100G
1 CREAM TOPICAL ONCE
Refills: 0 | Status: COMPLETED | OUTPATIENT
Start: 2019-08-18 | End: 2019-08-18

## 2019-08-18 RX ORDER — GABAPENTIN 400 MG/1
600 CAPSULE ORAL ONCE
Refills: 0 | Status: COMPLETED | OUTPATIENT
Start: 2019-08-18 | End: 2019-08-18

## 2019-08-18 RX ORDER — SODIUM CHLORIDE 9 MG/ML
1000 INJECTION INTRAMUSCULAR; INTRAVENOUS; SUBCUTANEOUS ONCE
Refills: 0 | Status: COMPLETED | OUTPATIENT
Start: 2019-08-18 | End: 2019-08-18

## 2019-08-18 RX ORDER — MORPHINE SULFATE 50 MG/1
4 CAPSULE, EXTENDED RELEASE ORAL ONCE
Refills: 0 | Status: DISCONTINUED | OUTPATIENT
Start: 2019-08-18 | End: 2019-08-18

## 2019-08-18 RX ORDER — HYDROMORPHONE HYDROCHLORIDE 2 MG/ML
1 INJECTION INTRAMUSCULAR; INTRAVENOUS; SUBCUTANEOUS ONCE
Refills: 0 | Status: DISCONTINUED | OUTPATIENT
Start: 2019-08-18 | End: 2019-08-18

## 2019-08-18 RX ORDER — ACETAMINOPHEN 500 MG
1000 TABLET ORAL ONCE
Refills: 0 | Status: DISCONTINUED | OUTPATIENT
Start: 2019-08-18 | End: 2019-08-19

## 2019-08-18 RX ORDER — ONDANSETRON 8 MG/1
4 TABLET, FILM COATED ORAL ONCE
Refills: 0 | Status: COMPLETED | OUTPATIENT
Start: 2019-08-18 | End: 2019-08-18

## 2019-08-18 RX ADMIN — LIDOCAINE 1 PATCH: 4 CREAM TOPICAL at 22:08

## 2019-08-18 RX ADMIN — SODIUM CHLORIDE 1000 MILLILITER(S): 9 INJECTION INTRAMUSCULAR; INTRAVENOUS; SUBCUTANEOUS at 22:06

## 2019-08-18 RX ADMIN — MORPHINE SULFATE 4 MILLIGRAM(S): 50 CAPSULE, EXTENDED RELEASE ORAL at 20:45

## 2019-08-18 RX ADMIN — MORPHINE SULFATE 4 MILLIGRAM(S): 50 CAPSULE, EXTENDED RELEASE ORAL at 21:07

## 2019-08-18 RX ADMIN — Medication 30 MILLIGRAM(S): at 21:07

## 2019-08-18 RX ADMIN — Medication 30 MILLIGRAM(S): at 22:02

## 2019-08-18 RX ADMIN — SODIUM CHLORIDE 1000 MILLILITER(S): 9 INJECTION INTRAMUSCULAR; INTRAVENOUS; SUBCUTANEOUS at 20:45

## 2019-08-18 RX ADMIN — HYDROMORPHONE HYDROCHLORIDE 1 MILLIGRAM(S): 2 INJECTION INTRAMUSCULAR; INTRAVENOUS; SUBCUTANEOUS at 21:07

## 2019-08-18 RX ADMIN — Medication 125 MILLIGRAM(S): at 20:45

## 2019-08-18 RX ADMIN — GABAPENTIN 600 MILLIGRAM(S): 400 CAPSULE ORAL at 22:08

## 2019-08-18 RX ADMIN — HYDROMORPHONE HYDROCHLORIDE 1 MILLIGRAM(S): 2 INJECTION INTRAMUSCULAR; INTRAVENOUS; SUBCUTANEOUS at 21:56

## 2019-08-18 RX ADMIN — ONDANSETRON 4 MILLIGRAM(S): 8 TABLET, FILM COATED ORAL at 20:45

## 2019-08-18 NOTE — ED PROVIDER NOTE - OBJECTIVE STATEMENT
58 y/o Female with a PMHx of right sciatica presents to the ED with months of R leg pain that worsened within the past week. Pain starts from the buttocks and radiates to the back of R leg with associated back pain. Scheduled for follow up surgeon, Dr. Celestin this week. Gabapentin was taken as prescribed without relief most recently at 3PM. Patient does not endorse fever, chills , chest pain, palpitations, nausea, vomiting, diarrhea, abd pain, injury, bladder incontinence, bowel incontinence or trauma. 60 y/o Female with a PMHx of right sciatica presents to the ED with appx 3 months of R leg pain that worsened within the past week. Pain starts from the buttocks and radiates to the back of R leg with associated back pain. Scheduled for follow up pain management, Dr. Fawad Gleason this week for stimulator placement per daughter. Gabapentin was taken as prescribed without relief most recently at 3PM (600mg). patient also takes naproxen, last dose this morning. Patient does not endorse fever, chills , chest pain, palpitations, nausea, vomiting, diarrhea, abd pain, injury, bladder incontinence, bowel incontinence or trauma numbness or tingling. states that she has weakness in her right leg when she places weight on the led.

## 2019-08-18 NOTE — ED ADULT NURSE NOTE - OBJECTIVE STATEMENT
RUL pain x months worst today took Gabapentin 1500H today but to no avail  about to have her Surgery for her Sciatica RUL pain x months worst today took Gabapentin 1500H today but to no avail  about to have her Surgery for her Sciatica  + pedal pulses

## 2019-08-18 NOTE — ED PROVIDER NOTE - CLINICAL SUMMARY MEDICAL DECISION MAKING FREE TEXT BOX
60 y/o Female with a PMHx of right sciatica presents to the ED with months of R leg pain which worsened within the past week. No associated bowel or bladder incontinence. Patient is taking Gabapentin as prescribed, last taken at 3pm today. Pt appears well nontoxic but uncomfortable. Distal sensation intact. No spinal tenderness to palpation. Plan for medications and reevaluation. 60 y/o Female with a PMHx of right sciatica presents to the ED with months of R leg pain which worsened within the past week. No associated bowel or bladder incontinence. Patient is taking Gabapentin as prescribed, last taken at 3pm today. Pt appears well nontoxic but uncomfortable. Distal sensation intact. rectal tone intact. No spinal tenderness to palpation, but right lumbar paraspinal ttp. Plan for medications and reevaluation. 58 y/o Female with a PMHx of right sciatica presents to the ED with months of R leg pain which worsened within the past week. No associated bowel or bladder incontinence. Patient is taking Gabapentin as prescribed, last taken at 3pm today. Pt appears well nontoxic but uncomfortable. Distal sensation intact. rectal tone intact. No spinal tenderness to palpation, but right lumbar paraspinal ttp. Plan for medications and reevaluation. on multiple re-evaluation, patient states improvement of symptoms (although still present). patient was unable to ambulate. Patient currently does not have any symptoms concerning for cauda equina syndrome such as saddle anesthesia, bowel or bladder incontinence or abscess. Patient does not endorse trauma to the area and therefore do not believe that imaging is warranted at this time. No history of iv drug use or history of cancer. No note of fever on vitals. Believe that pain is most likeley muscular/nerve like in nature. High sensitivity neurologic exam does not exhibit deficit on physical examination. Patient was advised to avoid driving, operating heavy machinery, or drinking alcohol when taking pain medication and/or muscle relaxers and to avoid heavy lifting and pain exacerbatieng movement. Patient is advised to use heat as well as ice and massage and stay active.

## 2019-08-18 NOTE — ED ADULT NURSE NOTE - NSIMPLEMENTINTERV_GEN_ALL_ED
Implemented All Universal Safety Interventions:  [place] to call system. Call bell, personal items and telephone within reach. Instruct patient to call for assistance. Room bathroom lighting operational. Non-slip footwear when patient is off stretcher. Physically safe environment: no spills, clutter or unnecessary equipment. Stretcher in lowest position, wheels locked, appropriate side rails in place.

## 2019-08-18 NOTE — ED PROVIDER NOTE - PROGRESS NOTE DETAILS
re-evaluation aptient states that her pain is improving. states pain is still present in her right knee "where the pain is always the worst". unable to ambulate at this time. will continue to monitor and re-eval. re-evaluation patient states symptoms continue to improve. requesting tylenol. patient to be ambulated patient states pain is improved. will attempt to ambulate with walker. disposition pending patient unable to ambulate, therefore to be admitted for pain control

## 2019-08-18 NOTE — ED PROVIDER NOTE - ATTENDING CONTRIBUTION TO CARE
Pt w/ PMHx R sided sciatica, followed by pain management Dr Gleason on Gabapentin and Naproxen p/w 1 week worsening RLE pain, burning / tingling pain from the hip to the knee, unable to control the pain. No bowel or bladder incontinence. No LE weakness. Pt has had steroid injections x 2 within the past 2 months and they did not help. Pt is supposed to get nerve stimulator this Wed w/ Dr Gleason. Pt took Gabapentin 3 pm and no other meds. No f/c, CP, SOB, abd pain, flank pain, F/U/D or hematuria. Pt w/ PMHx R sided sciatica, followed by pain management Dr Gleason on Gabapentin and Naproxen p/w 1 week worsening RLE pain, burning / tingling pain from the hip to the knee, unable to control the pain. No bowel or bladder incontinence. No LE weakness. Pt has had steroid injections x 2 within the past 2 months and they did not help. Pt is supposed to get nerve stimulator this Wed w/ Dr Gleason. Pt took Gabapentin 600mg at 3 pm and Naproxen at 8 am w/o relief of pain. No f/c, CP, SOB, abd pain, flank pain, F/U/D or hematuria. Pt reports she had been having increasingly diff ambulating 2/2 pain over the past 2-3 weeks, quit her job, and is applying for disability. Pt had MRI TL spine 8/4/19, results reviewed -   "1. Multilevel degenerative disc disease including small central disc   herniations at the T8-T9-T10 levels.   2. Multilevel degenerative osteoarthritis but no significant foraminal   stenosis or central canal stenosis. "  "There is normal curvature to the lumbar lordosis. The vertebral bodies are   normal height and configuration. The intervertebral disc spaces are within   normal limits. The conus terminates at the L1 level and demonstrates no   evidence of abnormal signal changes.   Evaluation of the individual levels demonstrates at the L5/S1 level there is   no evidence of a focal disc herniation. There is mild to moderate facet and   ligamentous hypertrophy. There is mild foraminal narrowing. There is mild   spinal canal stenosis.   At the L4/L5 level there is no evidence of a focal disc herniation. There is   moderate facet and ligamentous hypertrophy. There is a tiny subchondral cyst   seen in the left facet joint. There is mild foraminal narrowing. There is   mild spinal canal stenosis.   At the L3/L4 level there is no evidence of a focal disc herniation. There is   mild facet and ligamentous hypertrophy. There is mild right foraminal   narrowing. The left neuroforamen is patent. There is minimal spinal canal   stenosis.   The remaining levels demonstrate no evidence of focal disc herniation or   spinal canal stenosis. There is mild facet degenerative at L2/L3. There is   mild bilateral foraminal narrowing at L1/L2 and left L2/L3.   Impression: Congenital stenosis of the lumbar spine. Mild facet degenerative   changes. No evidence of focal disc protrusion."   Constitutional: Well appearing, well nourished, awake, alert, oriented to person, place, time/situation and uncomfortable appearing  ENMT: Airway patent. Normal MM  Eyes: Clear bilaterally  Cardiac: Normal rate, regular rhythm.  Heart sounds S1, S2.  No murmurs, rubs or gallops.  Respiratory: Breaths sounds equal and clear b/l. No increased WOB, tachypnea, hypoxia, or accessory mm use. Pt speaks in full sentences.   Gastrointestinal: Abd soft, NT, ND, NABS. No guarding, rebound, or rigidity. No pulsatile abdominal masses. No organomegaly appreciated. No CVAT   Musculoskeletal: Spine appears normal. No midline spinal ttp throughout the spine. + R sided lumbar paraspinal mm ttp. + painful ROM. 5/5 w/ plantar and dorsiflexion, 4/5 quads/hamstrings/hip flexion. no saddle anesthesia, limited by pain. + rectal tone. no saddle anesthesia. + SLR On R only.  Neuro: Alert and oriented x 3, face symmetric and speech fluent. Strength 5/5 x 4 ext and symmetric, nml gross motor movement, nml gait. No focal deficits noted.  Skin: Skin normal color for race, warm, dry and intact. No evidence of rash.  Psych: Alert and oriented to person, place, time/situation. normal mood and affect. no apparent risk to self or others.   Worsening radicular type pain, not controlled by pain management course of tx. Recent MRI's reviewed. Exam not c/w cauda equina. Sx ongoing since prior to MRI. Pain management given w/ mild improvement in pain, although still sig pt's mobility. Will admit for further pain management, spine consult, pain management consult.

## 2019-08-18 NOTE — ED ADULT NURSE REASSESSMENT NOTE - NS ED NURSE REASSESS COMMENT FT1
pt verbalized partial relief from pain, addressed questions on pain management and side effects of pain medications

## 2019-08-18 NOTE — ED PROVIDER NOTE - PHYSICAL EXAMINATION
General Appearance: Patient is well developed and well nourished. Patient is lying in stretcher, not diaphoretic and does not appear in acute distress.      Pulm: Breath sounds present throughout all lung fields. Chest expansion symmetric bilaterally. No evidence of wheezes, rales, rhonchi or retraction.       Cardio: Regular rate and rhythm. No murmurs, rubs or gallops.    Abdomen: Bowel sounds present all 4 quadrants Soft nontender. no note of pulsatile masses. No rebound or guarding.      MSK: ttp right lumbar paraspinal muscles, no spinal tt. No evidence of edema, erythema or bony deformity on body. Strength 5/5 in arms and legs bilaterally.There is no spinal midline tenderness, step offs or crepitus. ROM of the right hip limited 2/2 pain. full rom, although with pain of the right knee.     recta: dtr strength intact     Neuro: Distal sensation intact in arms and legs bilaterally. Knee and ankle reflexes 2+ right    y. gait steady. gcs 15.    psych: mood and affect appropriate.     skin: warm dry and intact- no note of erythema edema purpura petechia ecchymosis

## 2019-08-18 NOTE — ED ADULT NURSE REASSESSMENT NOTE - NS ED NURSE REASSESS COMMENT FT1
pt urinated on the bed pan, having an urge. pt urinated on the bed monahan, having an urge. Frank LEON aware

## 2019-08-19 ENCOUNTER — TRANSCRIPTION ENCOUNTER (OUTPATIENT)
Age: 59
End: 2019-08-19

## 2019-08-19 ENCOUNTER — INPATIENT (INPATIENT)
Facility: HOSPITAL | Age: 59
LOS: 0 days | Discharge: HOME | DRG: 552 | End: 2019-08-19
Attending: STUDENT IN AN ORGANIZED HEALTH CARE EDUCATION/TRAINING PROGRAM | Admitting: STUDENT IN AN ORGANIZED HEALTH CARE EDUCATION/TRAINING PROGRAM
Payer: COMMERCIAL

## 2019-08-19 VITALS
SYSTOLIC BLOOD PRESSURE: 121 MMHG | DIASTOLIC BLOOD PRESSURE: 66 MMHG | HEART RATE: 67 BPM | OXYGEN SATURATION: 97 % | RESPIRATION RATE: 18 BRPM | TEMPERATURE: 98 F

## 2019-08-19 DIAGNOSIS — R09.89 OTHER SPECIFIED SYMPTOMS AND SIGNS INVOLVING THE CIRCULATORY AND RESPIRATORY SYSTEMS: ICD-10-CM

## 2019-08-19 DIAGNOSIS — M54.31 SCIATICA, RIGHT SIDE: ICD-10-CM

## 2019-08-19 DIAGNOSIS — R63.8 OTHER SYMPTOMS AND SIGNS CONCERNING FOOD AND FLUID INTAKE: ICD-10-CM

## 2019-08-19 DIAGNOSIS — Z91.89 OTHER SPECIFIED PERSONAL RISK FACTORS, NOT ELSEWHERE CLASSIFIED: ICD-10-CM

## 2019-08-19 LAB
ALBUMIN SERPL ELPH-MCNC: 4.7 G/DL — SIGNIFICANT CHANGE UP (ref 3.3–5)
ALP SERPL-CCNC: 80 U/L — SIGNIFICANT CHANGE UP (ref 40–120)
ALT FLD-CCNC: 34 U/L — SIGNIFICANT CHANGE UP (ref 10–45)
ANION GAP SERPL CALC-SCNC: 11 MMOL/L — SIGNIFICANT CHANGE UP (ref 5–17)
ANION GAP SERPL CALC-SCNC: 11 MMOL/L — SIGNIFICANT CHANGE UP (ref 5–17)
ANION GAP SERPL CALC-SCNC: 15 MMOL/L — SIGNIFICANT CHANGE UP (ref 5–17)
APPEARANCE UR: CLEAR — SIGNIFICANT CHANGE UP
AST SERPL-CCNC: 21 U/L — SIGNIFICANT CHANGE UP (ref 10–40)
BILIRUB SERPL-MCNC: 0.3 MG/DL — SIGNIFICANT CHANGE UP (ref 0.2–1.2)
BILIRUB UR-MCNC: NEGATIVE — SIGNIFICANT CHANGE UP
BUN SERPL-MCNC: 13 MG/DL — SIGNIFICANT CHANGE UP (ref 7–23)
BUN SERPL-MCNC: 14 MG/DL — SIGNIFICANT CHANGE UP (ref 7–23)
BUN SERPL-MCNC: 16 MG/DL — SIGNIFICANT CHANGE UP (ref 7–23)
CALCIUM SERPL-MCNC: 10 MG/DL — SIGNIFICANT CHANGE UP (ref 8.4–10.5)
CALCIUM SERPL-MCNC: 10.2 MG/DL — SIGNIFICANT CHANGE UP (ref 8.4–10.5)
CALCIUM SERPL-MCNC: 10.2 MG/DL — SIGNIFICANT CHANGE UP (ref 8.4–10.5)
CHLORIDE SERPL-SCNC: 104 MMOL/L — SIGNIFICANT CHANGE UP (ref 96–108)
CHLORIDE SERPL-SCNC: 106 MMOL/L — SIGNIFICANT CHANGE UP (ref 96–108)
CHLORIDE SERPL-SCNC: 108 MMOL/L — SIGNIFICANT CHANGE UP (ref 96–108)
CO2 SERPL-SCNC: 25 MMOL/L — SIGNIFICANT CHANGE UP (ref 22–31)
CO2 SERPL-SCNC: 26 MMOL/L — SIGNIFICANT CHANGE UP (ref 22–31)
CO2 SERPL-SCNC: 28 MMOL/L — SIGNIFICANT CHANGE UP (ref 22–31)
COLOR SPEC: YELLOW — SIGNIFICANT CHANGE UP
CREAT SERPL-MCNC: 0.65 MG/DL — SIGNIFICANT CHANGE UP (ref 0.5–1.3)
CREAT SERPL-MCNC: 0.66 MG/DL — SIGNIFICANT CHANGE UP (ref 0.5–1.3)
CREAT SERPL-MCNC: 0.76 MG/DL — SIGNIFICANT CHANGE UP (ref 0.5–1.3)
DIFF PNL FLD: NEGATIVE — SIGNIFICANT CHANGE UP
GLUCOSE SERPL-MCNC: 115 MG/DL — HIGH (ref 70–99)
GLUCOSE SERPL-MCNC: 159 MG/DL — HIGH (ref 70–99)
GLUCOSE SERPL-MCNC: 99 MG/DL — SIGNIFICANT CHANGE UP (ref 70–99)
GLUCOSE UR QL: NEGATIVE — SIGNIFICANT CHANGE UP
HCT VFR BLD CALC: 34.6 % — SIGNIFICANT CHANGE UP (ref 34.5–45)
HCV AB S/CO SERPL IA: 0.07 S/CO — SIGNIFICANT CHANGE UP
HCV AB SERPL-IMP: SIGNIFICANT CHANGE UP
HGB BLD-MCNC: 11.6 G/DL — SIGNIFICANT CHANGE UP (ref 11.5–15.5)
KETONES UR-MCNC: ABNORMAL MG/DL
LEUKOCYTE ESTERASE UR-ACNC: ABNORMAL
MAGNESIUM SERPL-MCNC: 1.8 MG/DL — SIGNIFICANT CHANGE UP (ref 1.6–2.6)
MCHC RBC-ENTMCNC: 30.6 PG — SIGNIFICANT CHANGE UP (ref 27–34)
MCHC RBC-ENTMCNC: 33.5 GM/DL — SIGNIFICANT CHANGE UP (ref 32–36)
MCV RBC AUTO: 91.3 FL — SIGNIFICANT CHANGE UP (ref 80–100)
NITRITE UR-MCNC: NEGATIVE — SIGNIFICANT CHANGE UP
NRBC # BLD: 0 /100 WBCS — SIGNIFICANT CHANGE UP (ref 0–0)
PH UR: 6.5 — SIGNIFICANT CHANGE UP (ref 5–8)
PLATELET # BLD AUTO: 287 K/UL — SIGNIFICANT CHANGE UP (ref 150–400)
POTASSIUM SERPL-MCNC: 3.9 MMOL/L — SIGNIFICANT CHANGE UP (ref 3.5–5.3)
POTASSIUM SERPL-MCNC: 4.9 MMOL/L — SIGNIFICANT CHANGE UP (ref 3.5–5.3)
POTASSIUM SERPL-MCNC: 5.6 MMOL/L — HIGH (ref 3.5–5.3)
POTASSIUM SERPL-SCNC: 3.9 MMOL/L — SIGNIFICANT CHANGE UP (ref 3.5–5.3)
POTASSIUM SERPL-SCNC: 4.9 MMOL/L — SIGNIFICANT CHANGE UP (ref 3.5–5.3)
POTASSIUM SERPL-SCNC: 5.6 MMOL/L — HIGH (ref 3.5–5.3)
PROT SERPL-MCNC: 7.6 G/DL — SIGNIFICANT CHANGE UP (ref 6–8.3)
PROT UR-MCNC: NEGATIVE MG/DL — SIGNIFICANT CHANGE UP
RBC # BLD: 3.79 M/UL — LOW (ref 3.8–5.2)
RBC # FLD: 11.7 % — SIGNIFICANT CHANGE UP (ref 10.3–14.5)
SODIUM SERPL-SCNC: 143 MMOL/L — SIGNIFICANT CHANGE UP (ref 135–145)
SODIUM SERPL-SCNC: 145 MMOL/L — SIGNIFICANT CHANGE UP (ref 135–145)
SODIUM SERPL-SCNC: 146 MMOL/L — HIGH (ref 135–145)
SP GR SPEC: 1.02 — SIGNIFICANT CHANGE UP (ref 1–1.03)
UROBILINOGEN FLD QL: 0.2 E.U./DL — SIGNIFICANT CHANGE UP
WBC # BLD: 5.07 K/UL — SIGNIFICANT CHANGE UP (ref 3.8–10.5)
WBC # FLD AUTO: 5.07 K/UL — SIGNIFICANT CHANGE UP (ref 3.8–10.5)

## 2019-08-19 PROCEDURE — 93010 ELECTROCARDIOGRAM REPORT: CPT

## 2019-08-19 PROCEDURE — 96365 THER/PROPH/DIAG IV INF INIT: CPT

## 2019-08-19 PROCEDURE — 97161 PT EVAL LOW COMPLEX 20 MIN: CPT

## 2019-08-19 PROCEDURE — 86803 HEPATITIS C AB TEST: CPT

## 2019-08-19 PROCEDURE — 85027 COMPLETE CBC AUTOMATED: CPT

## 2019-08-19 PROCEDURE — 93005 ELECTROCARDIOGRAM TRACING: CPT

## 2019-08-19 PROCEDURE — 99222 1ST HOSP IP/OBS MODERATE 55: CPT | Mod: GC

## 2019-08-19 PROCEDURE — 96361 HYDRATE IV INFUSION ADD-ON: CPT

## 2019-08-19 PROCEDURE — 85025 COMPLETE CBC W/AUTO DIFF WBC: CPT

## 2019-08-19 PROCEDURE — 81001 URINALYSIS AUTO W/SCOPE: CPT

## 2019-08-19 PROCEDURE — 99285 EMERGENCY DEPT VISIT HI MDM: CPT

## 2019-08-19 PROCEDURE — 96375 TX/PRO/DX INJ NEW DRUG ADDON: CPT

## 2019-08-19 PROCEDURE — 99285 EMERGENCY DEPT VISIT HI MDM: CPT | Mod: 25

## 2019-08-19 PROCEDURE — 80053 COMPREHEN METABOLIC PANEL: CPT

## 2019-08-19 PROCEDURE — 83735 ASSAY OF MAGNESIUM: CPT

## 2019-08-19 PROCEDURE — 36415 COLL VENOUS BLD VENIPUNCTURE: CPT

## 2019-08-19 PROCEDURE — 80048 BASIC METABOLIC PNL TOTAL CA: CPT

## 2019-08-19 RX ORDER — ACETAMINOPHEN 500 MG
650 TABLET ORAL EVERY 6 HOURS
Refills: 0 | Status: DISCONTINUED | OUTPATIENT
Start: 2019-08-19 | End: 2019-08-19

## 2019-08-19 RX ORDER — KETOROLAC TROMETHAMINE 30 MG/ML
30 SYRINGE (ML) INJECTION EVERY 6 HOURS
Refills: 0 | Status: DISCONTINUED | OUTPATIENT
Start: 2019-08-19 | End: 2019-08-19

## 2019-08-19 RX ORDER — GABAPENTIN 400 MG/1
800 CAPSULE ORAL EVERY 8 HOURS
Refills: 0 | Status: DISCONTINUED | OUTPATIENT
Start: 2019-08-19 | End: 2019-08-19

## 2019-08-19 RX ORDER — LIDOCAINE 4 G/100G
1 CREAM TOPICAL EVERY 24 HOURS
Refills: 0 | Status: DISCONTINUED | OUTPATIENT
Start: 2019-08-19 | End: 2019-08-19

## 2019-08-19 RX ORDER — MAGNESIUM SULFATE 500 MG/ML
1 VIAL (ML) INJECTION ONCE
Refills: 0 | Status: DISCONTINUED | OUTPATIENT
Start: 2019-08-19 | End: 2019-08-19

## 2019-08-19 RX ORDER — ENOXAPARIN SODIUM 100 MG/ML
40 INJECTION SUBCUTANEOUS EVERY 24 HOURS
Refills: 0 | Status: DISCONTINUED | OUTPATIENT
Start: 2019-08-19 | End: 2019-08-19

## 2019-08-19 RX ORDER — GABAPENTIN 400 MG/1
1 CAPSULE ORAL
Qty: 9 | Refills: 0
Start: 2019-08-19 | End: 2019-08-21

## 2019-08-19 RX ORDER — TRAMADOL HYDROCHLORIDE 50 MG/1
50 TABLET ORAL EVERY 6 HOURS
Refills: 0 | Status: DISCONTINUED | OUTPATIENT
Start: 2019-08-19 | End: 2019-08-19

## 2019-08-19 RX ORDER — CHLORZOXAZONE 250 MG
0 TABLET ORAL
Qty: 0 | Refills: 0 | DISCHARGE

## 2019-08-19 RX ORDER — GABAPENTIN 400 MG/1
600 CAPSULE ORAL EVERY 8 HOURS
Refills: 0 | Status: DISCONTINUED | OUTPATIENT
Start: 2019-08-19 | End: 2019-08-19

## 2019-08-19 RX ORDER — ACETAMINOPHEN 500 MG
1000 TABLET ORAL ONCE
Refills: 0 | Status: COMPLETED | OUTPATIENT
Start: 2019-08-19 | End: 2019-08-19

## 2019-08-19 RX ORDER — MORPHINE SULFATE 50 MG/1
2 CAPSULE, EXTENDED RELEASE ORAL EVERY 4 HOURS
Refills: 0 | Status: DISCONTINUED | OUTPATIENT
Start: 2019-08-19 | End: 2019-08-19

## 2019-08-19 RX ORDER — GABAPENTIN 400 MG/1
1 CAPSULE ORAL
Qty: 0 | Refills: 0 | DISCHARGE

## 2019-08-19 RX ADMIN — MORPHINE SULFATE 2 MILLIGRAM(S): 50 CAPSULE, EXTENDED RELEASE ORAL at 03:10

## 2019-08-19 RX ADMIN — TRAMADOL HYDROCHLORIDE 50 MILLIGRAM(S): 50 TABLET ORAL at 12:00

## 2019-08-19 RX ADMIN — LIDOCAINE 1 PATCH: 4 CREAM TOPICAL at 12:36

## 2019-08-19 RX ADMIN — Medication 30 MILLIGRAM(S): at 12:00

## 2019-08-19 RX ADMIN — Medication 650 MILLIGRAM(S): at 06:00

## 2019-08-19 RX ADMIN — Medication 30 MILLIGRAM(S): at 11:03

## 2019-08-19 RX ADMIN — Medication 30 MILLIGRAM(S): at 05:59

## 2019-08-19 RX ADMIN — Medication 30 MILLIGRAM(S): at 06:14

## 2019-08-19 RX ADMIN — GABAPENTIN 800 MILLIGRAM(S): 400 CAPSULE ORAL at 15:58

## 2019-08-19 RX ADMIN — ENOXAPARIN SODIUM 40 MILLIGRAM(S): 100 INJECTION SUBCUTANEOUS at 12:34

## 2019-08-19 RX ADMIN — Medication 1000 MILLIGRAM(S): at 00:39

## 2019-08-19 RX ADMIN — Medication 400 MILLIGRAM(S): at 00:19

## 2019-08-19 RX ADMIN — TRAMADOL HYDROCHLORIDE 50 MILLIGRAM(S): 50 TABLET ORAL at 11:04

## 2019-08-19 RX ADMIN — MORPHINE SULFATE 2 MILLIGRAM(S): 50 CAPSULE, EXTENDED RELEASE ORAL at 03:25

## 2019-08-19 RX ADMIN — Medication 650 MILLIGRAM(S): at 12:32

## 2019-08-19 RX ADMIN — Medication 650 MILLIGRAM(S): at 07:00

## 2019-08-19 RX ADMIN — Medication 1000 MILLIGRAM(S): at 01:15

## 2019-08-19 RX ADMIN — GABAPENTIN 800 MILLIGRAM(S): 400 CAPSULE ORAL at 06:00

## 2019-08-19 NOTE — H&P ADULT - PROBLEM SELECTOR PLAN 1
has hx of sciatica, now w/ worsening pain and inability to walk  -pain control w/ lidocaine patch, tylenol, naproxen  -PT consult  -tramadol if needed for uncontrolled pain has hx of sciatica, now w/ progressively worsening pain and inability to walk w/o inciting event, MR thorax 8/4 shows multiplte disc bulges and herniations and OA of spine, MR lumbar 8/4 shows congenital spinal stenosis  -pain control w/ standing lidocaine patch, tylenol, naproxen  -PT consult  -morphine 2 q4 prn for refractory pain  -outpt fu w/ pain mgmt for wed appt of stimulator insertion  -increase gabapentin from 600 tid to 800 tid as pt w/o drowsiness from it  -cold packs  -consider ortho consult or outpt fu to see if surgical candidate, perhaps outpt appt after wed so can go if stimulator fails has hx of sciatica, now w/ progressively worsening pain and inability to walk w/o inciting event, MR thorax 8/4 shows multiple disc bulges and herniations and OA of spine, MR lumbar 8/4 shows congenital spinal stenosis  -pain control w/ standing lidocaine patch, tylenol, naproxen  -PT consult  -morphine 2 q4 prn for refractory pain  -outpt fu w/ pain mgmt for wed appt of stimulator insertion  -increase gabapentin from 600 tid to 800 tid as pt w/o drowsiness from it  -cold packs  -consider ortho consult or outpt fu to see if surgical candidate, perhaps outpt appt after wed so can go if stimulator fails has hx of sciatica, now w/ progressively worsening pain and inability to walk w/o inciting event, MR thorax 8/4 shows multiple disc bulges and herniations and OA of spine, MR lumbar 8/4 shows congenital spinal stenosis  -pain control w/ standing lidocaine patch, tylenol, toradol 30 q6  -PT consult  -morphine 2 q4 prn for refractory pain  -outpt fu w/ pain mgmt for wed appt of stimulator insertion  -increase gabapentin from 600 tid to 800 tid as pt w/o drowsiness from it  -cold packs  -consider ortho fu outpt to see if surgical candidate if stimulator wed fails to work

## 2019-08-19 NOTE — DISCHARGE NOTE PROVIDER - NSDCCPCAREPLAN_GEN_ALL_CORE_FT
PRINCIPAL DISCHARGE DIAGNOSIS  Diagnosis: Right leg pain  Assessment and Plan of Treatment: You came to the emergency department because of severe right leg pain from your sciatica. You have recent imaging demonstrating disc bulging and herniations are are being followed by an outpatient physician with plans for a spinal stimulator placement.

## 2019-08-19 NOTE — PROGRESS NOTE ADULT - ATTENDING COMMENTS
Dispo: d/c home w/ walker, outpatient Pain Mgmt f/u   per Case Mgmt, Pt. does not have CHAVO benefit

## 2019-08-19 NOTE — DISCHARGE NOTE PROVIDER - HOSPITAL COURSE
Ms. Stewart is a 59-year-old female with a past medica history of RLE sciatica who presented to the emergency department with severe right leg pain and the inability to ambulate. She was found to have continued pain secondary to her sciatica and has outpatient spinal stimulator placement scheduled this week.            Problem List/Main Diagnoses (system-based):     1. Sciatica - Continued on home pain regimen, given Zofran for nausea/vomiting.        Inpatient treatment course: Continued home pain regimen        New medications: None    Labs to be followed outpatient: None    Exam to be followed outpatient: Pain exam to be completed following spinal stimulator placement with Dr. Gleason on Wednesday, August 21.

## 2019-08-19 NOTE — H&P ADULT - ATTENDING COMMENTS
Pt. seen and examined by me on 8/19, I have read Dr. Mcnair's H&P, I agree w/ her findings and plan of care as documented; see my progress note and d/c summary for updates

## 2019-08-19 NOTE — H&P ADULT - ASSESSMENT
59 F PMH sciatica admitted for inability to walk and pain control due to worsening sciatica. 59 F PMH R leg sciatica, admitted for inability to walk and pain control due to progressively worsening sciatica.

## 2019-08-19 NOTE — H&P ADULT - NSHPPHYSICALEXAM_GEN_ALL_CORE
.  VITAL SIGNS:  T(C): 36.8 (08-18-19 @ 22:18), Max: 36.8 (08-18-19 @ 20:21)  T(F): 98.3 (08-18-19 @ 22:18), Max: 98.3 (08-18-19 @ 22:18)  HR: 88 (08-18-19 @ 22:18) (88 - 96)  BP: 160/84 (08-18-19 @ 22:18) (160/84 - 171/97)  BP(mean): --  RR: 20 (08-18-19 @ 22:18) (20 - 22)  SpO2: 97% (08-18-19 @ 22:18) (97% - 99%)  Wt(kg): --    PHYSICAL EXAM:    Constitutional: WDWN resting comfortably in bed; NAD  Head: NC/AT  Eyes: PERRL, EOMI, clear conjunctiva  ENT: no nasal discharge; uvula midline, no oropharyngeal erythema or exudates; MMM  Neck: supple; no JVD or thyromegaly  Respiratory: CTA B/L; no W/R/R, no retractions  Cardiac: +S1/S2; RRR; no M/R/G;  Gastrointestinal: soft, NT/ND; no rebound or guarding; +BSx4  Extremities: WWP, no clubbing or cyanosis; no peripheral edema  Musculoskeletal: NROM x4; no joint swelling, tenderness or erythema  Vascular: 2+ radial, femoral, DP/PT pulses B/L  Dermatologic: skin warm, dry and intact; no rashes, wounds, or scars  Neurologic: AAOx3; CNII-XII grossly intact; no focal deficits  Psychiatric: affect and characteristics of appearance, verbalizations, behaviors are appropriate .  VITAL SIGNS:  T(C): 36.8 (08-18-19 @ 22:18), Max: 36.8 (08-18-19 @ 20:21)  T(F): 98.3 (08-18-19 @ 22:18), Max: 98.3 (08-18-19 @ 22:18)  HR: 88 (08-18-19 @ 22:18) (88 - 96)  BP: 160/84 (08-18-19 @ 22:18) (160/84 - 171/97)  BP(mean): --  RR: 20 (08-18-19 @ 22:18) (20 - 22)  SpO2: 97% (08-18-19 @ 22:18) (97% - 99%)  Wt(kg): --    PHYSICAL EXAM:  Constitutional: laying in bed, intermittently moaning and wincing in pain  Head: NC/AT  Eyes: PERRL, EOMI, clear conjunctiva  ENT: no nasal discharge; uvula midline, no oropharyngeal erythema or exudates; MMM  Respiratory: CTA B/L; no W/R/R, no retractions  Cardiac: +S1/S2; RRR; no M/R/G;  Gastrointestinal: soft, NT/ND; no rebound or guarding; +BSx4  Extremities: WWP, no clubbing or cyanosis; no peripheral edema  Musculoskeletal: + straight leg raise on R ext to 20 degrees, unable to fully lift RLE, FROM in LLE and UEs  Dermatologic: skin warm, dry and intact; no rashes, wounds, or scars  Neurologic: AAOx3; strength 5/5 in LLE, 3/5 in RLE  Psychiatric: affect and characteristics of appearance, verbalizations, behaviors are appropriate

## 2019-08-19 NOTE — DISCHARGE NOTE NURSING/CASE MANAGEMENT/SOCIAL WORK - NSDCDPATPORTLINK_GEN_ALL_CORE
You can access the [facility] Portal, offered by [facility], by registering with the following website: [email or web address]

## 2019-08-19 NOTE — H&P ADULT - PROBLEM SELECTOR PLAN 3
1) PCP Contacted on Admission: (N) --> Name & Phone #:  2) Date of Contact with PCP: TBD  3) PCP Contacted at Discharge: TBD  4) Summary of Handoff Given to PCP: TBD   5) Post-Discharge Appointment Date: TBD 1) PCP Contacted on Admission: (N) --> Name & Phone #: none, [facility] fu  2) Date of Contact with PCP: TBD  3) PCP Contacted at Discharge: TBD  4) Summary of Handoff Given to PCP: TBD   5) Post-Discharge Appointment Date: TBD

## 2019-08-19 NOTE — H&P ADULT - NSHPLABSRESULTS_GEN_ALL_CORE
.  LABS:                         12.4   10.32 )-----------( 367      ( 18 Aug 2019 23:29 )             36.8     08-18    146<H>  |  106  |  16  ----------------------------<  99  3.9   |  25  |  0.76    Ca    10.0      18 Aug 2019 23:29    TPro  7.6  /  Alb  4.7  /  TBili  0.3  /  DBili  x   /  AST  21  /  ALT  34  /  AlkPhos  80        Urinalysis Basic - ( 18 Aug 2019 23:29 )    Color: Yellow / Appearance: Clear / S.020 / pH: x  Gluc: x / Ketone: Trace mg/dL  / Bili: Negative / Urobili: 0.2 E.U./dL   Blood: x / Protein: NEGATIVE mg/dL / Nitrite: NEGATIVE   Leuk Esterase: Small / RBC: < 5 /HPF / WBC < 5 /HPF   Sq Epi: x / Non Sq Epi: 0-5 /HPF / Bacteria: Present /HPF                RADIOLOGY, EKG & ADDITIONAL TESTS: Reviewed.

## 2019-08-19 NOTE — PROGRESS NOTE ADULT - SUBJECTIVE AND OBJECTIVE BOX
Patient is a 59y old  Female who presents with a chief complaint of unable to walk (19 Aug 2019 14:34)      INTERVAL HPI/OVERNIGHT EVENTS:    Pt. seen and examined earlier today  Pt.'s  at bedside  Pt. c/o chronic RLE sciatica pain, unchanged from baseline, w/ difficulty ambulating, d/t pain  No new trauma or sx  Denies bowel/bladder incontinence, constipation, urinary retention  Pt. verbalized understanding of recent MRI findings, and verbalized plan to f/u w/ Pain Mgmt this week for nerve stimulator     Review of Systems: 12 point review of systems otherwise negative    MEDICATIONS  (STANDING):  acetaminophen   Tablet .. 650 milliGRAM(s) Oral every 6 hours  enoxaparin Injectable 40 milliGRAM(s) SubCutaneous every 24 hours  gabapentin 800 milliGRAM(s) Oral every 8 hours  ketorolac   Injectable 30 milliGRAM(s) IV Push every 6 hours  lidocaine   Patch 1 Patch Transdermal every 24 hours  magnesium sulfate  IVPB 1 Gram(s) IV Intermittent once    MEDICATIONS  (PRN):  morphine  - Injectable 2 milliGRAM(s) IV Push every 4 hours PRN Severe Pain (7 - 10)  traMADol 50 milliGRAM(s) Oral every 6 hours PRN Moderate Pain (4 - 6)      Allergies    No Known Allergies    Intolerances          Vital Signs Last 24 Hrs  T(C): 36.9 (19 Aug 2019 08:53), Max: 36.9 (19 Aug 2019 08:53)  T(F): 98.4 (19 Aug 2019 08:53), Max: 98.4 (19 Aug 2019 08:53)  HR: 67 (19 Aug 2019 08:53) (67 - 96)  BP: 121/66 (19 Aug 2019 08:53) (121/66 - 171/97)  BP(mean): --  RR: 18 (19 Aug 2019 08:53) (18 - 22)  SpO2: 97% (19 Aug 2019 08:53) (97% - 99%)  CAPILLARY BLOOD GLUCOSE            Physical Exam:  (earlier today)  Daily Height in cm: 157.48 (18 Aug 2019 20:21)    Daily   General:  intermittently uncomfortable-appearing  HEENT: MMM  CV:  RRR, no JVD  Lungs:  CTA B/L  Abdomen:  soft NT ND  Extremities: no edema B/L LE  Skin:  WWP  Neuro:  AAOx3, rest of exam per housestaff - limited d/t pain    LABS:                        11.6   5.07  )-----------( 287      ( 19 Aug 2019 06:13 )             34.6     -    143  |  104  |  14  ----------------------------<  115<H>  4.9   |  28  |  0.65    Ca    10.2      19 Aug 2019 12:46  Mg     1.8         TPro  7.6  /  Alb  4.7  /  TBili  0.3  /  DBili  x   /  AST  21  /  ALT  34  /  AlkPhos  80        Urinalysis Basic - ( 18 Aug 2019 23:29 )    Color: Yellow / Appearance: Clear / S.020 / pH: x  Gluc: x / Ketone: Trace mg/dL  / Bili: Negative / Urobili: 0.2 E.U./dL   Blood: x / Protein: NEGATIVE mg/dL / Nitrite: NEGATIVE   Leuk Esterase: Small / RBC: < 5 /HPF / WBC < 5 /HPF   Sq Epi: x / Non Sq Epi: 0-5 /HPF / Bacteria: Present /HPF          RADIOLOGY & ADDITIONAL TESTS:    ---------------------------------------------------------------------------  I personally reviewed: [  ]EKG   [  ]CXR    [  ] CT    [  ]Other  ---------------------------------------------------------------------------  PLEASE CHECK WHEN PRESENT:     [  ]Heart Failure     [  ] Acute     [  ] Acute on Chronic     [  ] Chronic  -------------------------------------------------------------------     [  ]Diastolic [HFpEF]     [  ]Systolic [HFrEF]     [  ]Combined [HFpEF & HFrEF]     [  ]Other:  -------------------------------------------------------------------  [  ]NICK     [  ]ATN     [  ]Reneal Medullary Necrosis     [  ]Renal Cortical Necrosis     [  ]Other Pathological Lesions:    [  ]CKD 1  [  ]CKD 2  [  ]CKD 3  [  ]CKD 4  [  ]CKD 5  [  ]Other  -------------------------------------------------------------------  [  ]Other/Unspecified:    --------------------------------------------------------------------    Abdominal Nutritional Status  [  ]Malnutrition: See Nutrition Note  [  ]Cachexia  [  ]Other:   [  ]Supplement Ordered:  [  ]Morbid Obesity (BMI >=40]

## 2019-08-19 NOTE — H&P ADULT - HISTORY OF PRESENT ILLNESS
bedbound for inability to get out bed, waxes and wanes, no incontience, no saddle anesthesia, rectal tone intact, radaites down leg, recent mri on 8/4 negative except for disc herniation, congential stenosis, follows w/ pain mgmt, some improvement w/ sx, unable to walk even w/ walker, now applied for disbaility, no injury or truama when started, works 7d per week as cna, s/p spinal injections 2 within 2 months w/o improvemebt, due for stimulator palcement on wed    -how arrive? in mabulance, writing in pain  -malingering? no, has pain 59 F PMH sciatica s/p spinal injections BIBEMS for severe leg pain and inability to walk even w/ walker. Pt reports she has been bedbound for several weeks since developed worsening R leg pain that radiates from her buttocks down the back of her leg. No inciting event, trauma, injury when it started to worsen. Pain waxes and wanes. Denies incontinence, saddle anesthesia. Pt follow outpt w/ pain mgmt. Pt works on her feet all day as a CNA, 7d/week. Pt recent spinal injections 2 months ago were w/o improvement Pt due for stimulator placement on wed 8/21.     Of note, recent MRI 8/4 at [facility] shows disc herniation and spinal stenosis?    In the ED VSS except for RR 22-->20, HR 96-->80s. S/p dilaudid, morphine, lidocaine patch, toradol. 59 F PMH sciatica R LE BIBEMS for severe leg pain and inability to walk.     Pt reports she has been bedbound for several weeks since developed worsening R leg pain that radiates from her buttocks down the back of her leg. No inciting event, trauma, injury when it started to worsen. Pain waxes and wanes. Denies incontinence, saddle anesthesia. Pt follow outpt w/ pain mgmt. Pt works on her feet all day as a CNA, 7d/week. Pt recent spinal injections 2 months ago were w/o improvement Pt due for stimulator placement on wed 8/21.     Of note, recent MRI 8/4 at [facility] shows disc herniation and spinal stenosis?    In the ED VSS except for RR 22-->20, HR 96-->80s. S/p dilaudid, morphine, lidocaine patch, toradol. 59 F PMH sciatica R LE BIBEMS for severe leg pain and inability to walk. Pt reports R leg pain since Feb/19, that has been progressively worsening. She denies any trauma/ fall/ inciting event. Pain waxes and wanes but never goes away completely. Pain is in her low back. Pain is worse w/ walking on it. Pain is not better with her outpt pain meds: gabapentin, tylenol, tramadol, naproxen. Cold packs help short term and laying on her left side. She tried PT but it made the pain worse so she stopped. Denies fevers/ saddle anesthesia/ numbness/ tingling/ incontinence in bowel or bladder.     Pt reports she has been bedbound for several weeks since developed worsening R leg pain that radiates from her buttocks down the back of her leg. No inciting event, trauma, injury when it started to worsen. Pain waxes and wanes. Denies incontinence, saddle anesthesia. Pt follow outpt w/ pain mgmt. Pt works on her feet all day as a CNA, 7d/week. Pt recent spinal injections 2 months ago were w/o improvement Pt due for stimulator placement on wed 8/21.     In the ED VSS except for RR 22-->20, HR 96-->80s. MRI 8/4 showing disc bulges, herniations, OA, and stenosis. S/p 1 L NS, zofran, solumedrol 125, dilaudid 1 iv, morphine 4 iv, lidocaine patch, toradol 30 iv, tylenol IV 1g, gabapentin 600. 59 F PMH sciatica of R DAYO PIZARRO for severe leg pain and inability to walk. Pt reports R leg pain since Feb/19, that has been progressively worsening. She denies any trauma/ fall/ inciting event. Pain waxes and wanes but never goes away completely. Pain is in her low back and her entire R leg (sides, anterior and posterior), describes pain as burning. Pain is worse w/ walking on it. Pain is not better with her outpt pain meds: gabapentin, tylenol, tramadol, naproxen. Cold packs help short term and laying on her left side helps a little. She tried spinal injections 2 mo ago x 2, w/o improvement. She tried outpt PT for 1 week but it made the pain worse so she stopped. Pt reports she has been unable to walk since 7/29 (she is "bedbound" except can walk to the bathroom its just very painful). 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 Denies fevers/ saddle anesthesia/ numbness/ tingling/ incontinence in bowel or bladder. No issues w/ left leg. Pt planned for stimulator placement by [facility] outpt on wed 8/21.     In the ED VSS except for RR 22-->20, HR 96-->80s. MRI 8/4 showing disc bulges, herniations, OA, and stenosis. S/p 1 L NS, zofran, solumedrol 125, dilaudid 1 iv, morphine 4 iv, lidocaine patch, toradol 30 iv, tylenol IV 1g, gabapentin 600.

## 2019-08-19 NOTE — PROGRESS NOTE ADULT - PROBLEM SELECTOR PLAN 1
chronic; Pt. has outpatient Pain Mgmt f/u (Dr. Gleason, w/ whom the housestaff spoke and updated); Pt. has f/u appt Wednesday 8/21 for spinal stimulator placement -- Pt. and  verbalized understanding; cont. Lidoderm patch, Neurontin, Tylenol + NSAIDs PRN; I-STOP reviewed, Pt. not on chronic benzos or opioids, will not prescribe at discharge; PT to provide Pt. w/ walker

## 2019-08-19 NOTE — H&P ADULT - NSHPSOCIALHISTORY_GEN_ALL_CORE
Lives w/ daughter, bedbound for ~1 month, quit job as CNA in 7/19, applying for disability. Quit smoking 20 years ago, drinks socially, no drugs.

## 2019-08-19 NOTE — H&P ADULT - PROBLEM SELECTOR PLAN 2
F: PO hydration  E: replete PRN  N: regular diet  DVT ppx: lovenox  other ppx: none required  FULL CODE  dispo: RMF  Ambulate as tolerated

## 2019-08-22 DIAGNOSIS — M47.894 OTHER SPONDYLOSIS, THORACIC REGION: ICD-10-CM

## 2019-08-22 DIAGNOSIS — M48.061 SPINAL STENOSIS, LUMBAR REGION WITHOUT NEUROGENIC CLAUDICATION: ICD-10-CM

## 2019-08-22 DIAGNOSIS — M51.14 INTERVERTEBRAL DISC DISORDERS WITH RADICULOPATHY, THORACIC REGION: ICD-10-CM

## 2019-08-22 DIAGNOSIS — M54.31 SCIATICA, RIGHT SIDE: ICD-10-CM

## 2019-08-22 DIAGNOSIS — M79.604 PAIN IN RIGHT LEG: ICD-10-CM

## 2020-01-22 NOTE — ED ADULT NURSE NOTE - FINAL NURSING ELECTRONIC SIGNATURE
Continue home dose of aspirin.  Will hold eliquis for now. I discussed with patient the plan to hold eliquis at this time due to the concern for bleeding resulting in the drop in hemoglobin, and that the goal will be to resume eliquis when it is safe to resume the medication.  Of note, patient had a code stroke called during her previous hospitalization due to an episode of AMS. She was evaluated by the neurology team at that time- there was low suspicion for a cerebrovascular event. MRI performed at that time found no evidence of acute infarction. Her chronic infarctions in R frontal and parietal lobes, and chronic lacunar infarcts of basal ganglia bilaterally were noted, with mild-moderate chronic microvascular ischemic disease. 23-Apr-2017 19:14

## 2020-07-31 NOTE — ED PROVIDER NOTE - NS ED NOTE AC HIGH RISK COUNTRIES
POC reviewed with wife over the telephone. No acute events this shift. Precedex, heparin, fentanyl, propofol continued. Temps today, notify team for temp greater than 101.5, PRN tylenol and ice packs given. Adequate UOP noted. No bm this shift. VS and assessments per flowsheets, WCTM.     No

## 2022-03-12 NOTE — ED ADULT NURSE NOTE - NS ED NURSE DC INFO COMPLEXITY
I will place a prescription for Diclofenac instead of Norco for the patient.    Rx placed today    HARI ColemanM           Simple: Patient demonstrates quick and easy understanding

## 2022-04-03 NOTE — ED PROVIDER NOTE - CPE EDP PSYCH NORM
Remains on Xanax 0.25 mg 1/2-1 daily as needed. She goes for about 30 pills/month. Ultimate goal will be to wean her off of Xanax over time. She has been reluctant to take SSRIs in the past and has been through counseling. normal...

## 2023-04-11 NOTE — ED PROVIDER NOTE - NS HIV RISK FACTOR YES
Called patient. Informed pt per Josué, monitor incision and continue with gauze and bandage. If no bloody bowel movements between now and tomorrow morning ( 4/12 11AM), instructed pt to resume Lovenox for DVT prophylaxis. If bloody BM returns after restarting Lovenox, instructed pt to update office and we will discontinue Lovenox. Pt verbalized understanding.    Declined

## 2023-10-25 NOTE — ED PROVIDER NOTE - CROS ED ROS STATEMENT
Pt is requesting refill for the following medication    BUPROPION HCL  MG TABLET     QTY : 90    TAKE 1 TABLET BY MOUTH EVERY DAY all other ROS negative except as per HPI